# Patient Record
Sex: MALE | Race: WHITE | Employment: OTHER | URBAN - METROPOLITAN AREA
[De-identification: names, ages, dates, MRNs, and addresses within clinical notes are randomized per-mention and may not be internally consistent; named-entity substitution may affect disease eponyms.]

---

## 2017-06-27 ENCOUNTER — OFFICE VISIT (OUTPATIENT)
Dept: FAMILY MEDICINE CLINIC | Age: 52
End: 2017-06-27

## 2017-06-27 VITALS
DIASTOLIC BLOOD PRESSURE: 84 MMHG | BODY MASS INDEX: 25.77 KG/M2 | HEIGHT: 70 IN | WEIGHT: 180 LBS | HEART RATE: 100 BPM | OXYGEN SATURATION: 98 % | TEMPERATURE: 98.3 F | SYSTOLIC BLOOD PRESSURE: 138 MMHG

## 2017-06-27 DIAGNOSIS — M54.42 CHRONIC LEFT-SIDED LOW BACK PAIN WITH LEFT-SIDED SCIATICA: ICD-10-CM

## 2017-06-27 DIAGNOSIS — G89.29 CHRONIC LEFT SHOULDER PAIN: ICD-10-CM

## 2017-06-27 DIAGNOSIS — G89.29 CHRONIC LEFT-SIDED LOW BACK PAIN WITH LEFT-SIDED SCIATICA: ICD-10-CM

## 2017-06-27 DIAGNOSIS — M25.512 CHRONIC LEFT SHOULDER PAIN: ICD-10-CM

## 2017-06-27 DIAGNOSIS — M54.12 CERVICAL RADICULOPATHY: ICD-10-CM

## 2017-06-27 DIAGNOSIS — R29.898 WEAKNESS OF LEFT LEG: ICD-10-CM

## 2017-06-27 PROCEDURE — G8427 DOCREV CUR MEDS BY ELIG CLIN: HCPCS | Performed by: NURSE PRACTITIONER

## 2017-06-27 PROCEDURE — G8419 CALC BMI OUT NRM PARAM NOF/U: HCPCS | Performed by: NURSE PRACTITIONER

## 2017-06-27 PROCEDURE — 4004F PT TOBACCO SCREEN RCVD TLK: CPT | Performed by: NURSE PRACTITIONER

## 2017-06-27 PROCEDURE — 99203 OFFICE O/P NEW LOW 30 MIN: CPT | Performed by: NURSE PRACTITIONER

## 2017-06-27 RX ORDER — CYCLOBENZAPRINE HCL 10 MG
10 TABLET ORAL 3 TIMES DAILY PRN
Qty: 30 TABLET | Refills: 0 | Status: SHIPPED | OUTPATIENT
Start: 2017-06-27 | End: 2017-07-17 | Stop reason: SDUPTHER

## 2017-06-27 RX ORDER — PREDNISONE 10 MG/1
TABLET ORAL
Qty: 32 TABLET | Refills: 0 | Status: SHIPPED | OUTPATIENT
Start: 2017-06-27 | End: 2017-07-07

## 2017-06-29 ENCOUNTER — TELEPHONE (OUTPATIENT)
Dept: FAMILY MEDICINE CLINIC | Age: 52
End: 2017-06-29

## 2017-06-29 RX ORDER — GABAPENTIN 300 MG/1
CAPSULE ORAL
Qty: 60 CAPSULE | Refills: 0 | Status: SHIPPED | OUTPATIENT
Start: 2017-06-29 | End: 2017-07-07 | Stop reason: SDUPTHER

## 2017-06-30 ENCOUNTER — HOSPITAL ENCOUNTER (OUTPATIENT)
Dept: MRI IMAGING | Age: 52
Discharge: HOME | End: 2017-06-30
Attending: NURSE PRACTITIONER | Admitting: NURSE PRACTITIONER

## 2017-06-30 DIAGNOSIS — R29.898 WEAKNESS OF LEFT LEG: ICD-10-CM

## 2017-06-30 DIAGNOSIS — M54.12 CERVICAL RADICULOPATHY: ICD-10-CM

## 2017-06-30 DIAGNOSIS — M54.42 CHRONIC LEFT-SIDED LOW BACK PAIN WITH LEFT-SIDED SCIATICA: ICD-10-CM

## 2017-06-30 DIAGNOSIS — M54.42 LOW BACK PAIN WITH LEFT-SIDED SCIATICA: ICD-10-CM

## 2017-06-30 DIAGNOSIS — G89.29 CHRONIC LEFT-SIDED LOW BACK PAIN WITH LEFT-SIDED SCIATICA: ICD-10-CM

## 2017-07-03 DIAGNOSIS — M48.02 SPINAL STENOSIS OF CERVICAL REGION: ICD-10-CM

## 2017-07-03 DIAGNOSIS — R93.7 ABNORMAL FINDINGS ON DIAGNOSTIC IMAGING OF OTHER PARTS OF MUSCULOSKELETAL SYSTEM: ICD-10-CM

## 2017-07-03 DIAGNOSIS — M48.02 FORAMINAL STENOSIS OF CERVICAL REGION: ICD-10-CM

## 2017-07-03 DIAGNOSIS — M45.2 ANKYLOSING SPONDYLITIS OF CERVICAL REGION (HCC): ICD-10-CM

## 2017-07-03 DIAGNOSIS — M48.061 LUMBAR STENOSIS: ICD-10-CM

## 2017-07-03 RX ORDER — DICLOFENAC SODIUM 75 MG/1
75 TABLET, DELAYED RELEASE ORAL 2 TIMES DAILY
Qty: 60 TABLET | Refills: 0 | Status: SHIPPED | OUTPATIENT
Start: 2017-07-03 | End: 2018-05-31 | Stop reason: ALTCHOICE

## 2017-07-05 ENCOUNTER — TELEPHONE (OUTPATIENT)
Dept: FAMILY MEDICINE CLINIC | Age: 52
End: 2017-07-05

## 2017-07-05 ENCOUNTER — OFFICE VISIT (OUTPATIENT)
Dept: ORTHOPEDIC SURGERY | Age: 52
End: 2017-07-05

## 2017-07-05 VITALS
HEIGHT: 70 IN | BODY MASS INDEX: 25.77 KG/M2 | HEART RATE: 85 BPM | DIASTOLIC BLOOD PRESSURE: 83 MMHG | WEIGHT: 180 LBS | SYSTOLIC BLOOD PRESSURE: 131 MMHG

## 2017-07-05 DIAGNOSIS — R29.898 LEFT ARM WEAKNESS: ICD-10-CM

## 2017-07-05 DIAGNOSIS — M50.20 HNP (HERNIATED NUCLEUS PULPOSUS), CERVICAL: Primary | ICD-10-CM

## 2017-07-05 DIAGNOSIS — G89.29 CHRONIC LEFT SHOULDER PAIN: ICD-10-CM

## 2017-07-05 DIAGNOSIS — M48.02 FORAMINAL STENOSIS OF CERVICAL REGION: ICD-10-CM

## 2017-07-05 DIAGNOSIS — M48.061 LUMBAR STENOSIS: ICD-10-CM

## 2017-07-05 DIAGNOSIS — M25.512 CHRONIC LEFT SHOULDER PAIN: ICD-10-CM

## 2017-07-05 PROCEDURE — 4004F PT TOBACCO SCREEN RCVD TLK: CPT | Performed by: PHYSICIAN ASSISTANT

## 2017-07-05 PROCEDURE — G8427 DOCREV CUR MEDS BY ELIG CLIN: HCPCS | Performed by: PHYSICIAN ASSISTANT

## 2017-07-05 PROCEDURE — G8419 CALC BMI OUT NRM PARAM NOF/U: HCPCS | Performed by: PHYSICIAN ASSISTANT

## 2017-07-05 PROCEDURE — 99203 OFFICE O/P NEW LOW 30 MIN: CPT | Performed by: PHYSICIAN ASSISTANT

## 2017-07-05 RX ORDER — OXYCODONE AND ACETAMINOPHEN 7.5; 325 MG/1; MG/1
1 TABLET ORAL EVERY 4 HOURS PRN
Qty: 15 TABLET | Refills: 0 | Status: SHIPPED | OUTPATIENT
Start: 2017-07-05 | End: 2017-07-10

## 2017-07-05 ASSESSMENT — ENCOUNTER SYMPTOMS
BACK PAIN: 1
COLOR CHANGE: 0
VOMITING: 0
SHORTNESS OF BREATH: 0
DIARRHEA: 0
NAUSEA: 0
BLOOD IN STOOL: 0
ABDOMINAL PAIN: 0
COUGH: 0

## 2017-07-05 NOTE — TELEPHONE ENCOUNTER
Referral to Ortho Per Allan Adrian there today for office visit. .  They want the patient to get EMG and bloodwork. Not sure what is going on? In a lot of pain. Can something be called in for pain? Please call patient wife or patient and advise asap.

## 2017-07-06 ENCOUNTER — HOSPITAL ENCOUNTER (OUTPATIENT)
Dept: MRI IMAGING | Age: 52
Discharge: HOME | End: 2017-07-06
Attending: NURSE PRACTITIONER | Admitting: NURSE PRACTITIONER

## 2017-07-06 DIAGNOSIS — G89.29 CHRONIC LEFT SHOULDER PAIN: ICD-10-CM

## 2017-07-06 DIAGNOSIS — R29.898 LEFT ARM WEAKNESS: ICD-10-CM

## 2017-07-06 DIAGNOSIS — M25.512 CHRONIC LEFT SHOULDER PAIN: ICD-10-CM

## 2017-07-06 DIAGNOSIS — M54.42 LOW BACK PAIN WITH LEFT-SIDED SCIATICA: ICD-10-CM

## 2017-07-07 ENCOUNTER — TELEPHONE (OUTPATIENT)
Dept: ORTHOPEDIC SURGERY | Age: 52
End: 2017-07-07

## 2017-07-07 DIAGNOSIS — M25.512 CHRONIC LEFT SHOULDER PAIN: ICD-10-CM

## 2017-07-07 DIAGNOSIS — G89.29 CHRONIC LEFT SHOULDER PAIN: ICD-10-CM

## 2017-07-07 DIAGNOSIS — M75.112 PARTIAL TEAR OF LEFT ROTATOR CUFF: ICD-10-CM

## 2017-07-07 RX ORDER — GABAPENTIN 300 MG/1
300 CAPSULE ORAL 3 TIMES DAILY
Qty: 90 CAPSULE | Refills: 0 | Status: SHIPPED | OUTPATIENT
Start: 2017-07-07 | End: 2017-07-10

## 2017-07-12 ENCOUNTER — HOSPITAL ENCOUNTER (OUTPATIENT)
Dept: PHYSICAL THERAPY | Age: 52
Discharge: HOME | End: 2017-07-31
Attending: NURSE PRACTITIONER | Admitting: NURSE PRACTITIONER

## 2017-07-13 ENCOUNTER — OFFICE VISIT (OUTPATIENT)
Dept: ORTHOPEDIC SURGERY | Age: 52
End: 2017-07-13

## 2017-07-13 VITALS — WEIGHT: 170 LBS | BODY MASS INDEX: 24.34 KG/M2 | HEIGHT: 70 IN

## 2017-07-13 VITALS
BODY MASS INDEX: 24.34 KG/M2 | HEIGHT: 70 IN | SYSTOLIC BLOOD PRESSURE: 132 MMHG | DIASTOLIC BLOOD PRESSURE: 80 MMHG | WEIGHT: 170 LBS | HEART RATE: 92 BPM

## 2017-07-13 DIAGNOSIS — M50.30 DDD (DEGENERATIVE DISC DISEASE), CERVICAL: ICD-10-CM

## 2017-07-13 DIAGNOSIS — M25.512 LEFT SHOULDER PAIN, UNSPECIFIED CHRONICITY: ICD-10-CM

## 2017-07-13 DIAGNOSIS — M54.12 CERVICAL RADICULOPATHY: Primary | ICD-10-CM

## 2017-07-13 DIAGNOSIS — M47.812 FACET ARTHROPATHY, CERVICAL: Primary | ICD-10-CM

## 2017-07-13 DIAGNOSIS — M75.42 IMPINGEMENT SYNDROME OF SHOULDER, LEFT: ICD-10-CM

## 2017-07-13 PROBLEM — M75.40 IMPINGEMENT SYNDROME OF SHOULDER: Status: ACTIVE | Noted: 2017-07-13

## 2017-07-13 PROCEDURE — G8427 DOCREV CUR MEDS BY ELIG CLIN: HCPCS | Performed by: PHYSICIAN ASSISTANT

## 2017-07-13 PROCEDURE — 99213 OFFICE O/P EST LOW 20 MIN: CPT | Performed by: PHYSICIAN ASSISTANT

## 2017-07-13 PROCEDURE — 4004F PT TOBACCO SCREEN RCVD TLK: CPT | Performed by: PHYSICIAN ASSISTANT

## 2017-07-13 PROCEDURE — G8420 CALC BMI NORM PARAMETERS: HCPCS | Performed by: ORTHOPAEDIC SURGERY

## 2017-07-13 PROCEDURE — 99214 OFFICE O/P EST MOD 30 MIN: CPT | Performed by: ORTHOPAEDIC SURGERY

## 2017-07-13 PROCEDURE — 4004F PT TOBACCO SCREEN RCVD TLK: CPT | Performed by: ORTHOPAEDIC SURGERY

## 2017-07-13 PROCEDURE — G8420 CALC BMI NORM PARAMETERS: HCPCS | Performed by: PHYSICIAN ASSISTANT

## 2017-07-13 PROCEDURE — G8427 DOCREV CUR MEDS BY ELIG CLIN: HCPCS | Performed by: ORTHOPAEDIC SURGERY

## 2017-07-13 PROCEDURE — 73030 X-RAY EXAM OF SHOULDER: CPT | Performed by: ORTHOPAEDIC SURGERY

## 2017-07-17 ENCOUNTER — OFFICE VISIT (OUTPATIENT)
Dept: FAMILY MEDICINE CLINIC | Age: 52
End: 2017-07-17

## 2017-07-17 VITALS
SYSTOLIC BLOOD PRESSURE: 138 MMHG | OXYGEN SATURATION: 98 % | HEIGHT: 70 IN | HEART RATE: 84 BPM | BODY MASS INDEX: 26.63 KG/M2 | RESPIRATION RATE: 18 BRPM | WEIGHT: 186 LBS | DIASTOLIC BLOOD PRESSURE: 84 MMHG

## 2017-07-17 DIAGNOSIS — M48.02 FORAMINAL STENOSIS OF CERVICAL REGION: ICD-10-CM

## 2017-07-17 DIAGNOSIS — M54.12 CERVICAL RADICULOPATHY: ICD-10-CM

## 2017-07-17 DIAGNOSIS — M48.061 LUMBAR STENOSIS: ICD-10-CM

## 2017-07-17 DIAGNOSIS — M50.30 DDD (DEGENERATIVE DISC DISEASE), CERVICAL: ICD-10-CM

## 2017-07-17 PROCEDURE — G8427 DOCREV CUR MEDS BY ELIG CLIN: HCPCS | Performed by: NURSE PRACTITIONER

## 2017-07-17 PROCEDURE — G8419 CALC BMI OUT NRM PARAM NOF/U: HCPCS | Performed by: NURSE PRACTITIONER

## 2017-07-17 PROCEDURE — 99213 OFFICE O/P EST LOW 20 MIN: CPT | Performed by: NURSE PRACTITIONER

## 2017-07-17 PROCEDURE — 4004F PT TOBACCO SCREEN RCVD TLK: CPT | Performed by: NURSE PRACTITIONER

## 2017-07-17 RX ORDER — CYCLOBENZAPRINE HCL 10 MG
10 TABLET ORAL 3 TIMES DAILY PRN
Qty: 30 TABLET | Refills: 1 | Status: SHIPPED | OUTPATIENT
Start: 2017-07-17 | End: 2017-07-27

## 2017-07-24 DIAGNOSIS — R93.7 ABNORMAL FINDINGS ON DIAGNOSTIC IMAGING OF OTHER PARTS OF MUSCULOSKELETAL SYSTEM: ICD-10-CM

## 2017-07-24 DIAGNOSIS — M45.2 ANKYLOSING SPONDYLITIS OF CERVICAL REGION (HCC): ICD-10-CM

## 2017-07-24 LAB
A/G RATIO: 1.6 (ref 1.1–2.2)
ALBUMIN SERPL-MCNC: 4.9 G/DL (ref 3.4–5)
ALP BLD-CCNC: 100 U/L (ref 40–129)
ALT SERPL-CCNC: 37 U/L (ref 10–40)
ANION GAP SERPL CALCULATED.3IONS-SCNC: 19 MMOL/L (ref 3–16)
AST SERPL-CCNC: 17 U/L (ref 15–37)
BASOPHILS ABSOLUTE: 0 K/UL (ref 0–0.2)
BASOPHILS RELATIVE PERCENT: 0.3 %
BILIRUB SERPL-MCNC: 0.5 MG/DL (ref 0–1)
BUN BLDV-MCNC: 12 MG/DL (ref 7–20)
C-REACTIVE PROTEIN: 7.8 MG/L (ref 0–5.1)
CALCIUM SERPL-MCNC: 9.6 MG/DL (ref 8.3–10.6)
CHLORIDE BLD-SCNC: 102 MMOL/L (ref 99–110)
CO2: 19 MMOL/L (ref 21–32)
CREAT SERPL-MCNC: 0.9 MG/DL (ref 0.9–1.3)
EOSINOPHILS ABSOLUTE: 0.1 K/UL (ref 0–0.6)
EOSINOPHILS RELATIVE PERCENT: 1.2 %
GFR AFRICAN AMERICAN: >60
GFR NON-AFRICAN AMERICAN: >60
GLOBULIN: 3.1 G/DL
GLUCOSE BLD-MCNC: 102 MG/DL (ref 70–99)
HCT VFR BLD CALC: 51.3 % (ref 40.5–52.5)
HEMOGLOBIN: 16.7 G/DL (ref 13.5–17.5)
LYMPHOCYTES ABSOLUTE: 4.3 K/UL (ref 1–5.1)
LYMPHOCYTES RELATIVE PERCENT: 39.9 %
MCH RBC QN AUTO: 29.8 PG (ref 26–34)
MCHC RBC AUTO-ENTMCNC: 32.7 G/DL (ref 31–36)
MCV RBC AUTO: 91.2 FL (ref 80–100)
MONOCYTES ABSOLUTE: 0.6 K/UL (ref 0–1.3)
MONOCYTES RELATIVE PERCENT: 5.3 %
NEUTROPHILS ABSOLUTE: 5.8 K/UL (ref 1.7–7.7)
NEUTROPHILS RELATIVE PERCENT: 53.3 %
PDW BLD-RTO: 14.5 % (ref 12.4–15.4)
PLATELET # BLD: 409 K/UL (ref 135–450)
PMV BLD AUTO: 8 FL (ref 5–10.5)
POTASSIUM SERPL-SCNC: 4.7 MMOL/L (ref 3.5–5.1)
RBC # BLD: 5.62 M/UL (ref 4.2–5.9)
RHEUMATOID FACTOR: <10 IU/ML
SEDIMENTATION RATE, ERYTHROCYTE: 10 MM/HR (ref 0–15)
SODIUM BLD-SCNC: 140 MMOL/L (ref 136–145)
TOTAL PROTEIN: 8 G/DL (ref 6.4–8.2)
WBC # BLD: 10.9 K/UL (ref 4–11)

## 2017-07-25 ENCOUNTER — TELEPHONE (OUTPATIENT)
Dept: FAMILY MEDICINE CLINIC | Age: 52
End: 2017-07-25

## 2017-07-25 ENCOUNTER — OFFICE VISIT (OUTPATIENT)
Dept: ORTHOPEDIC SURGERY | Age: 52
End: 2017-07-25

## 2017-07-25 VITALS
HEIGHT: 70 IN | DIASTOLIC BLOOD PRESSURE: 88 MMHG | WEIGHT: 179.9 LBS | SYSTOLIC BLOOD PRESSURE: 125 MMHG | BODY MASS INDEX: 25.75 KG/M2 | HEART RATE: 118 BPM

## 2017-07-25 DIAGNOSIS — M47.812 CERVICAL SPONDYLOSIS WITHOUT MYELOPATHY: Primary | ICD-10-CM

## 2017-07-25 LAB
ANA INTERPRETATION: NORMAL
ANTI-NUCLEAR ANTIBODY (ANA): NEGATIVE

## 2017-07-25 PROCEDURE — 4004F PT TOBACCO SCREEN RCVD TLK: CPT | Performed by: ORTHOPAEDIC SURGERY

## 2017-07-25 PROCEDURE — G8427 DOCREV CUR MEDS BY ELIG CLIN: HCPCS | Performed by: ORTHOPAEDIC SURGERY

## 2017-07-25 PROCEDURE — 99213 OFFICE O/P EST LOW 20 MIN: CPT | Performed by: ORTHOPAEDIC SURGERY

## 2017-07-25 PROCEDURE — G8419 CALC BMI OUT NRM PARAM NOF/U: HCPCS | Performed by: ORTHOPAEDIC SURGERY

## 2017-07-26 LAB
CCP IGG ANTIBODIES: 3 UNITS (ref 0–19)
HLA B27: NEGATIVE

## 2017-08-18 PROBLEM — M47.812 FACET ARTHROPATHY, CERVICAL: Status: ACTIVE | Noted: 2017-08-18

## 2017-08-18 PROBLEM — M48.061 LUMBAR STENOSIS: Status: ACTIVE | Noted: 2017-08-18

## 2017-08-18 PROBLEM — M48.02 CERVICAL STENOSIS OF SPINAL CANAL: Status: ACTIVE | Noted: 2017-08-18

## 2017-08-18 PROBLEM — M50.20 CERVICAL DISC DISPLACEMENT: Status: ACTIVE | Noted: 2017-08-18

## 2017-08-18 PROBLEM — M51.36 LUMBAR DEGENERATIVE DISC DISEASE: Status: ACTIVE | Noted: 2017-08-18

## 2017-08-18 PROBLEM — M47.816 LUMBAR FACET ARTHROPATHY: Status: ACTIVE | Noted: 2017-08-18

## 2017-08-29 RX ORDER — GABAPENTIN 300 MG/1
300 CAPSULE ORAL 3 TIMES DAILY
Qty: 90 CAPSULE | Refills: 0 | Status: SHIPPED | OUTPATIENT
Start: 2017-08-29 | End: 2019-12-06

## 2019-05-18 ENCOUNTER — HOSPITAL ENCOUNTER (EMERGENCY)
Age: 54
Discharge: HOME | End: 2019-05-18
Payer: MEDICARE

## 2019-05-18 VITALS
TEMPERATURE: 98.4 F | HEIGHT: 70 IN | HEART RATE: 85 BPM | DIASTOLIC BLOOD PRESSURE: 90 MMHG | BODY MASS INDEX: 25.77 KG/M2 | SYSTOLIC BLOOD PRESSURE: 136 MMHG | WEIGHT: 180 LBS | RESPIRATION RATE: 16 BRPM | OXYGEN SATURATION: 96 %

## 2019-05-18 DIAGNOSIS — G89.29 ACUTE EXACERBATION OF CHRONIC LOW BACK PAIN: Primary | ICD-10-CM

## 2019-05-18 DIAGNOSIS — M54.50 ACUTE EXACERBATION OF CHRONIC LOW BACK PAIN: Primary | ICD-10-CM

## 2019-05-18 PROCEDURE — 99282 EMERGENCY DEPT VISIT SF MDM: CPT

## 2019-05-18 PROCEDURE — 6370000000 HC RX 637 (ALT 250 FOR IP): Performed by: PHYSICIAN ASSISTANT

## 2019-05-18 PROCEDURE — 6360000002 HC RX W HCPCS: Performed by: PHYSICIAN ASSISTANT

## 2019-05-18 RX ORDER — DEXAMETHASONE 4 MG/1
10 TABLET ORAL EVERY 12 HOURS SCHEDULED
Status: DISCONTINUED | OUTPATIENT
Start: 2019-05-18 | End: 2019-05-18

## 2019-05-18 RX ORDER — OXYCODONE HYDROCHLORIDE AND ACETAMINOPHEN 5; 325 MG/1; MG/1
1 TABLET ORAL ONCE
Status: COMPLETED | OUTPATIENT
Start: 2019-05-18 | End: 2019-05-18

## 2019-05-18 RX ORDER — CYCLOBENZAPRINE HCL 10 MG
TABLET ORAL
COMMUNITY
Start: 2019-05-07 | End: 2019-06-22 | Stop reason: ALTCHOICE

## 2019-05-18 RX ORDER — DEXAMETHASONE 4 MG/1
10 TABLET ORAL EVERY 12 HOURS SCHEDULED
Status: DISCONTINUED | OUTPATIENT
Start: 2019-05-18 | End: 2019-05-18 | Stop reason: HOSPADM

## 2019-05-18 RX ORDER — METHYLPREDNISOLONE 4 MG/1
TABLET ORAL
Qty: 1 KIT | Refills: 0 | Status: SHIPPED | OUTPATIENT
Start: 2019-05-18 | End: 2019-05-24

## 2019-05-18 RX ORDER — METHOCARBAMOL 750 MG/1
750 TABLET, FILM COATED ORAL 4 TIMES DAILY
Qty: 40 TABLET | Refills: 0 | Status: SHIPPED | OUTPATIENT
Start: 2019-05-18 | End: 2019-05-28

## 2019-05-18 RX ORDER — OMEPRAZOLE 20 MG/1
20 CAPSULE, DELAYED RELEASE ORAL
COMMUNITY

## 2019-05-18 RX ORDER — METHOCARBAMOL 750 MG/1
750 TABLET, FILM COATED ORAL ONCE
Status: COMPLETED | OUTPATIENT
Start: 2019-05-18 | End: 2019-05-18

## 2019-05-18 RX ORDER — ATORVASTATIN CALCIUM 40 MG/1
40 TABLET, FILM COATED ORAL
COMMUNITY
Start: 2019-04-02 | End: 2019-12-06

## 2019-05-18 RX ORDER — OXYCODONE HYDROCHLORIDE AND ACETAMINOPHEN 5; 325 MG/1; MG/1
1 TABLET ORAL EVERY 8 HOURS PRN
Qty: 12 TABLET | Refills: 0 | Status: SHIPPED | OUTPATIENT
Start: 2019-05-18 | End: 2019-06-22

## 2019-05-18 RX ADMIN — METHOCARBAMOL TABLETS 750 MG: 750 TABLET, COATED ORAL at 13:59

## 2019-05-18 RX ADMIN — DEXAMETHASONE 10 MG: 4 TABLET ORAL at 13:59

## 2019-05-18 RX ADMIN — OXYCODONE HYDROCHLORIDE AND ACETAMINOPHEN 1 TABLET: 5; 325 TABLET ORAL at 13:59

## 2019-05-18 ASSESSMENT — PAIN SCALES - GENERAL
PAINLEVEL_OUTOF10: 10
PAINLEVEL_OUTOF10: 10
PAINLEVEL_OUTOF10: 6

## 2019-05-18 ASSESSMENT — PAIN DESCRIPTION - PAIN TYPE: TYPE: CHRONIC PAIN
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
## 2019-06-22 ENCOUNTER — HOSPITAL ENCOUNTER (EMERGENCY)
Age: 54
Discharge: HOME | End: 2019-06-22
Payer: MEDICARE

## 2019-06-22 VITALS
RESPIRATION RATE: 16 BRPM | HEIGHT: 70 IN | DIASTOLIC BLOOD PRESSURE: 85 MMHG | TEMPERATURE: 98.6 F | OXYGEN SATURATION: 95 % | SYSTOLIC BLOOD PRESSURE: 131 MMHG | BODY MASS INDEX: 25.77 KG/M2 | WEIGHT: 180 LBS | HEART RATE: 90 BPM

## 2019-06-22 DIAGNOSIS — G89.29 CHRONIC LEFT-SIDED LOW BACK PAIN WITH LEFT-SIDED SCIATICA: Primary | ICD-10-CM

## 2019-06-22 DIAGNOSIS — M54.42 CHRONIC LEFT-SIDED LOW BACK PAIN WITH LEFT-SIDED SCIATICA: Primary | ICD-10-CM

## 2019-06-22 PROCEDURE — 6370000000 HC RX 637 (ALT 250 FOR IP): Performed by: NURSE PRACTITIONER

## 2019-06-22 PROCEDURE — 96372 THER/PROPH/DIAG INJ SC/IM: CPT

## 2019-06-22 PROCEDURE — 99282 EMERGENCY DEPT VISIT SF MDM: CPT

## 2019-06-22 PROCEDURE — 6360000002 HC RX W HCPCS: Performed by: NURSE PRACTITIONER

## 2019-06-22 RX ORDER — ONDANSETRON 4 MG/1
4 TABLET, ORALLY DISINTEGRATING ORAL ONCE
Status: COMPLETED | OUTPATIENT
Start: 2019-06-22 | End: 2019-06-22

## 2019-06-22 RX ORDER — METHYLPREDNISOLONE 4 MG/1
TABLET ORAL
Qty: 1 KIT | Refills: 0 | Status: SHIPPED | OUTPATIENT
Start: 2019-06-22 | End: 2019-06-28

## 2019-06-22 RX ORDER — TIZANIDINE 4 MG/1
4 TABLET ORAL EVERY 6 HOURS PRN
COMMUNITY
End: 2019-12-06

## 2019-06-22 RX ORDER — HYDROCODONE BITARTRATE AND ACETAMINOPHEN 5; 325 MG/1; MG/1
1 TABLET ORAL EVERY 6 HOURS PRN
Qty: 10 TABLET | Refills: 0 | Status: SHIPPED | OUTPATIENT
Start: 2019-06-22 | End: 2019-06-25

## 2019-06-22 RX ORDER — ONDANSETRON 4 MG/1
4 TABLET, ORALLY DISINTEGRATING ORAL EVERY 8 HOURS PRN
Qty: 20 TABLET | Refills: 0 | Status: SHIPPED | OUTPATIENT
Start: 2019-06-22 | End: 2019-12-06

## 2019-06-22 RX ADMIN — HYDROMORPHONE HYDROCHLORIDE 1 MG: 1 INJECTION, SOLUTION INTRAMUSCULAR; INTRAVENOUS; SUBCUTANEOUS at 14:46

## 2019-06-22 RX ADMIN — ONDANSETRON 4 MG: 4 TABLET, ORALLY DISINTEGRATING ORAL at 14:46

## 2019-06-22 ASSESSMENT — PAIN SCALES - GENERAL
PAINLEVEL_OUTOF10: 10
PAINLEVEL_OUTOF10: 5
PAINLEVEL_OUTOF10: 10

## 2019-06-22 ASSESSMENT — ENCOUNTER SYMPTOMS
WHEEZING: 0
COUGH: 0
ABDOMINAL DISTENTION: 0
ABDOMINAL PAIN: 0
VOMITING: 0
SHORTNESS OF BREATH: 0
BACK PAIN: 1
ALLERGIC/IMMUNOLOGIC NEGATIVE: 1
NAUSEA: 0
EYES NEGATIVE: 1

## 2019-06-22 ASSESSMENT — PAIN DESCRIPTION - PAIN TYPE: TYPE: CHRONIC PAIN

## 2019-06-22 ASSESSMENT — PAIN DESCRIPTION - LOCATION: LOCATION: BACK
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
## 2019-06-22 NOTE — ED NOTES
Pt scripts x2 instructed to follow up with PCP/Neurologist. Assessed per Marissa Metcalf NP.      Deangelo Villanueva LPN  24/99/00 8159

## 2019-12-06 ENCOUNTER — HOSPITAL ENCOUNTER (EMERGENCY)
Age: 54
Discharge: HOME | End: 2019-12-06
Payer: MEDICARE

## 2019-12-06 ENCOUNTER — APPOINTMENT (OUTPATIENT)
Dept: GENERAL RADIOLOGY | Age: 54
End: 2019-12-06
Payer: MEDICARE

## 2019-12-06 ENCOUNTER — APPOINTMENT (OUTPATIENT)
Dept: CT IMAGING | Age: 54
End: 2019-12-06
Payer: MEDICARE

## 2019-12-06 VITALS
WEIGHT: 180 LBS | RESPIRATION RATE: 16 BRPM | OXYGEN SATURATION: 95 % | HEART RATE: 68 BPM | DIASTOLIC BLOOD PRESSURE: 68 MMHG | BODY MASS INDEX: 25.83 KG/M2 | TEMPERATURE: 98.1 F | SYSTOLIC BLOOD PRESSURE: 133 MMHG

## 2019-12-06 DIAGNOSIS — S02.2XXA CLOSED FRACTURE OF NASAL BONE, INITIAL ENCOUNTER: ICD-10-CM

## 2019-12-06 DIAGNOSIS — S09.90XA CLOSED HEAD INJURY, INITIAL ENCOUNTER: Primary | ICD-10-CM

## 2019-12-06 DIAGNOSIS — S02.85XA RIGHT ORBITAL FRACTURE, CLOSED, INITIAL ENCOUNTER (HCC): ICD-10-CM

## 2019-12-06 LAB
A/G RATIO: 1.3 (ref 1.1–2.2)
ALBUMIN SERPL-MCNC: 4.3 G/DL (ref 3.4–5)
ALP BLD-CCNC: 94 U/L (ref 40–129)
ALT SERPL-CCNC: 23 U/L (ref 10–40)
ANION GAP SERPL CALCULATED.3IONS-SCNC: 12 MMOL/L (ref 3–16)
AST SERPL-CCNC: 18 U/L (ref 15–37)
BASOPHILS ABSOLUTE: 0 K/UL (ref 0–0.2)
BASOPHILS RELATIVE PERCENT: 0.4 %
BILIRUB SERPL-MCNC: <0.2 MG/DL (ref 0–1)
BILIRUBIN URINE: NEGATIVE
BLOOD, URINE: NEGATIVE
BUN BLDV-MCNC: 12 MG/DL (ref 7–20)
CALCIUM SERPL-MCNC: 9.2 MG/DL (ref 8.3–10.6)
CHLORIDE BLD-SCNC: 104 MMOL/L (ref 99–110)
CLARITY: CLEAR
CO2: 21 MMOL/L (ref 21–32)
COLOR: YELLOW
CREAT SERPL-MCNC: 0.7 MG/DL (ref 0.9–1.3)
EOSINOPHILS ABSOLUTE: 0.1 K/UL (ref 0–0.6)
EOSINOPHILS RELATIVE PERCENT: 0.7 %
GFR AFRICAN AMERICAN: >60
GFR NON-AFRICAN AMERICAN: >60
GLOBULIN: 3.4 G/DL
GLUCOSE BLD-MCNC: 176 MG/DL (ref 70–99)
GLUCOSE URINE: NEGATIVE MG/DL
HCT VFR BLD CALC: 43.3 % (ref 40.5–52.5)
HEMOGLOBIN: 14.5 G/DL (ref 13.5–17.5)
KETONES, URINE: NEGATIVE MG/DL
LEUKOCYTE ESTERASE, URINE: NEGATIVE
LYMPHOCYTES ABSOLUTE: 4.1 K/UL (ref 1–5.1)
LYMPHOCYTES RELATIVE PERCENT: 37.1 %
MCH RBC QN AUTO: 30.5 PG (ref 26–34)
MCHC RBC AUTO-ENTMCNC: 33.5 G/DL (ref 31–36)
MCV RBC AUTO: 91.1 FL (ref 80–100)
MICROSCOPIC EXAMINATION: NORMAL
MONOCYTES ABSOLUTE: 0.5 K/UL (ref 0–1.3)
MONOCYTES RELATIVE PERCENT: 4.1 %
NEUTROPHILS ABSOLUTE: 6.4 K/UL (ref 1.7–7.7)
NEUTROPHILS RELATIVE PERCENT: 57.7 %
NITRITE, URINE: NEGATIVE
PDW BLD-RTO: 13.4 % (ref 12.4–15.4)
PH UA: 6 (ref 5–8)
PLATELET # BLD: 475 K/UL (ref 135–450)
PMV BLD AUTO: 6.9 FL (ref 5–10.5)
POTASSIUM SERPL-SCNC: 4.2 MMOL/L (ref 3.5–5.1)
PROTEIN UA: NEGATIVE MG/DL
RAPID INFLUENZA  B AGN: NEGATIVE
RAPID INFLUENZA A AGN: NEGATIVE
RBC # BLD: 4.76 M/UL (ref 4.2–5.9)
SODIUM BLD-SCNC: 137 MMOL/L (ref 136–145)
SPECIFIC GRAVITY UA: 1.01 (ref 1–1.03)
TOTAL PROTEIN: 7.7 G/DL (ref 6.4–8.2)
TROPONIN: <0.01 NG/ML
URINE REFLEX TO CULTURE: NORMAL
URINE TYPE: NORMAL
UROBILINOGEN, URINE: 0.2 E.U./DL
WBC # BLD: 11.1 K/UL (ref 4–11)

## 2019-12-06 PROCEDURE — 87804 INFLUENZA ASSAY W/OPTIC: CPT

## 2019-12-06 PROCEDURE — 6360000002 HC RX W HCPCS: Performed by: NURSE PRACTITIONER

## 2019-12-06 PROCEDURE — 99285 EMERGENCY DEPT VISIT HI MDM: CPT

## 2019-12-06 PROCEDURE — 71046 X-RAY EXAM CHEST 2 VIEWS: CPT

## 2019-12-06 PROCEDURE — 84484 ASSAY OF TROPONIN QUANT: CPT

## 2019-12-06 PROCEDURE — 80053 COMPREHEN METABOLIC PANEL: CPT

## 2019-12-06 PROCEDURE — 70450 CT HEAD/BRAIN W/O DYE: CPT

## 2019-12-06 PROCEDURE — 70486 CT MAXILLOFACIAL W/O DYE: CPT

## 2019-12-06 PROCEDURE — 96374 THER/PROPH/DIAG INJ IV PUSH: CPT

## 2019-12-06 PROCEDURE — 81003 URINALYSIS AUTO W/O SCOPE: CPT

## 2019-12-06 PROCEDURE — 85025 COMPLETE CBC W/AUTO DIFF WBC: CPT

## 2019-12-06 PROCEDURE — 96375 TX/PRO/DX INJ NEW DRUG ADDON: CPT

## 2019-12-06 PROCEDURE — 6370000000 HC RX 637 (ALT 250 FOR IP): Performed by: NURSE PRACTITIONER

## 2019-12-06 PROCEDURE — 93005 ELECTROCARDIOGRAM TRACING: CPT | Performed by: EMERGENCY MEDICINE

## 2019-12-06 RX ORDER — AMOXICILLIN AND CLAVULANATE POTASSIUM 875; 125 MG/1; MG/1
1 TABLET, FILM COATED ORAL 2 TIMES DAILY
Qty: 20 TABLET | Refills: 0 | Status: SHIPPED | OUTPATIENT
Start: 2019-12-06 | End: 2019-12-16

## 2019-12-06 RX ORDER — ACETAMINOPHEN 500 MG
1000 TABLET ORAL ONCE
Status: COMPLETED | OUTPATIENT
Start: 2019-12-06 | End: 2019-12-06

## 2019-12-06 RX ORDER — KETOROLAC TROMETHAMINE 30 MG/ML
30 INJECTION, SOLUTION INTRAMUSCULAR; INTRAVENOUS ONCE
Status: COMPLETED | OUTPATIENT
Start: 2019-12-06 | End: 2019-12-06

## 2019-12-06 RX ORDER — PROCHLORPERAZINE EDISYLATE 5 MG/ML
10 INJECTION INTRAMUSCULAR; INTRAVENOUS ONCE
Status: COMPLETED | OUTPATIENT
Start: 2019-12-06 | End: 2019-12-06

## 2019-12-06 RX ORDER — LORAZEPAM 2 MG/ML
0.5 INJECTION INTRAMUSCULAR ONCE
Status: COMPLETED | OUTPATIENT
Start: 2019-12-06 | End: 2019-12-06

## 2019-12-06 RX ORDER — TRAMADOL HYDROCHLORIDE 50 MG/1
50 TABLET ORAL EVERY 6 HOURS PRN
Qty: 20 TABLET | Refills: 0 | Status: SHIPPED | OUTPATIENT
Start: 2019-12-06 | End: 2019-12-09

## 2019-12-06 RX ADMIN — LORAZEPAM 0.5 MG: 2 INJECTION, SOLUTION INTRAMUSCULAR; INTRAVENOUS at 15:08

## 2019-12-06 RX ADMIN — KETOROLAC TROMETHAMINE 30 MG: 30 INJECTION, SOLUTION INTRAMUSCULAR at 16:11

## 2019-12-06 RX ADMIN — PROCHLORPERAZINE EDISYLATE 10 MG: 5 INJECTION INTRAMUSCULAR; INTRAVENOUS at 16:11

## 2019-12-06 RX ADMIN — ACETAMINOPHEN 1000 MG: 500 TABLET ORAL at 14:42

## 2019-12-06 ASSESSMENT — ENCOUNTER SYMPTOMS
NAUSEA: 0
SHORTNESS OF BREATH: 0
PHOTOPHOBIA: 0
SORE THROAT: 0
EYE REDNESS: 0
COLOR CHANGE: 0
BACK PAIN: 0
DIARRHEA: 0
ABDOMINAL PAIN: 0
COUGH: 1
WHEEZING: 0
VOMITING: 0

## 2019-12-06 ASSESSMENT — PAIN SCALES - GENERAL
PAINLEVEL_OUTOF10: 8
PAINLEVEL_OUTOF10: 10
PAINLEVEL_OUTOF10: 9
PAINLEVEL_OUTOF10: 10
PAINLEVEL_OUTOF10: 10

## 2019-12-07 LAB
EKG ATRIAL RATE: 67 BPM
EKG DIAGNOSIS: NORMAL
EKG P AXIS: 55 DEGREES
EKG P-R INTERVAL: 150 MS
EKG Q-T INTERVAL: 414 MS
EKG QRS DURATION: 88 MS
EKG QTC CALCULATION (BAZETT): 437 MS
EKG R AXIS: 31 DEGREES
EKG T AXIS: 51 DEGREES
EKG VENTRICULAR RATE: 67 BPM

## 2022-01-01 ENCOUNTER — APPOINTMENT (OUTPATIENT)
Dept: GENERAL RADIOLOGY | Age: 57
DRG: 215 | End: 2022-01-01
Payer: MEDICARE

## 2022-01-01 ENCOUNTER — HOSPITAL ENCOUNTER (INPATIENT)
Age: 57
LOS: 1 days | DRG: 215 | End: 2022-04-01
Attending: EMERGENCY MEDICINE | Admitting: INTERNAL MEDICINE
Payer: MEDICARE

## 2022-01-01 VITALS
TEMPERATURE: 96.8 F | WEIGHT: 180 LBS | SYSTOLIC BLOOD PRESSURE: 152 MMHG | BODY MASS INDEX: 25.77 KG/M2 | RESPIRATION RATE: 20 BRPM | OXYGEN SATURATION: 99 % | HEIGHT: 70 IN | DIASTOLIC BLOOD PRESSURE: 121 MMHG | HEART RATE: 80 BPM

## 2022-01-01 DIAGNOSIS — I21.3 ST ELEVATION MYOCARDIAL INFARCTION (STEMI), UNSPECIFIED ARTERY (HCC): Primary | ICD-10-CM

## 2022-01-01 LAB
A/G RATIO: 1.7 (ref 1.1–2.2)
ALBUMIN SERPL-MCNC: 4.8 G/DL (ref 3.4–5)
ALP BLD-CCNC: 125 U/L (ref 40–129)
ALT SERPL-CCNC: 183 U/L (ref 10–40)
ANION GAP SERPL CALCULATED.3IONS-SCNC: 17 MMOL/L (ref 3–16)
ANISOCYTOSIS: ABNORMAL
AST SERPL-CCNC: 167 U/L (ref 15–37)
ATYPICAL LYMPHOCYTE RELATIVE PERCENT: 6 % (ref 0–6)
BANDED NEUTROPHILS RELATIVE PERCENT: 7 % (ref 0–7)
BASE EXCESS ARTERIAL: -10 (ref -3–3)
BASOPHILS ABSOLUTE: 0 K/UL (ref 0–0.2)
BASOPHILS RELATIVE PERCENT: 0 %
BILIRUB SERPL-MCNC: 0.3 MG/DL (ref 0–1)
BUN BLDV-MCNC: 11 MG/DL (ref 7–20)
CALCIUM IONIZED: 0.98 MMOL/L (ref 1.12–1.32)
CALCIUM SERPL-MCNC: 9.3 MG/DL (ref 8.3–10.6)
CHLORIDE BLD-SCNC: 100 MMOL/L (ref 99–110)
CO2: 18 MMOL/L (ref 21–32)
CREAT SERPL-MCNC: 1 MG/DL (ref 0.9–1.3)
EKG ATRIAL RATE: 56 BPM
EKG DIAGNOSIS: NORMAL
EKG P AXIS: 45 DEGREES
EKG P-R INTERVAL: 150 MS
EKG Q-T INTERVAL: 426 MS
EKG QRS DURATION: 94 MS
EKG QTC CALCULATION (BAZETT): 411 MS
EKG R AXIS: 46 DEGREES
EKG T AXIS: 33 DEGREES
EKG VENTRICULAR RATE: 56 BPM
EOSINOPHILS ABSOLUTE: 0.2 K/UL (ref 0–0.6)
EOSINOPHILS RELATIVE PERCENT: 1 %
GFR AFRICAN AMERICAN: >60
GFR NON-AFRICAN AMERICAN: >60
GLUCOSE BLD-MCNC: 154 MG/DL (ref 70–99)
GLUCOSE BLD-MCNC: 324 MG/DL (ref 70–99)
HCO3 ARTERIAL: 19.5 MMOL/L (ref 21–29)
HCT VFR BLD CALC: 47.2 % (ref 40.5–52.5)
HEMATOLOGY PATH CONSULT: NORMAL
HEMATOLOGY PATH CONSULT: YES
HEMOGLOBIN: 14.6 GM/DL (ref 13.5–17.5)
HEMOGLOBIN: 15.4 G/DL (ref 13.5–17.5)
HYPOCHROMIA: ABNORMAL
INR BLD: 0.89 (ref 0.88–1.12)
LACTATE: 7.94 MMOL/L (ref 0.4–2)
LYMPHOCYTES ABSOLUTE: 7.5 K/UL (ref 1–5.1)
LYMPHOCYTES RELATIVE PERCENT: 34 %
MACROCYTES: ABNORMAL
MCH RBC QN AUTO: 29.1 PG (ref 26–34)
MCHC RBC AUTO-ENTMCNC: 32.7 G/DL (ref 31–36)
MCV RBC AUTO: 88.9 FL (ref 80–100)
MICROCYTES: ABNORMAL
MONOCYTES ABSOLUTE: 0.9 K/UL (ref 0–1.3)
MONOCYTES RELATIVE PERCENT: 5 %
NEUTROPHILS ABSOLUTE: 10.1 K/UL (ref 1.7–7.7)
NEUTROPHILS RELATIVE PERCENT: 47 %
O2 SAT, ARTERIAL: 95 % (ref 93–100)
PCO2 ARTERIAL: 62.9 MM HG (ref 35–45)
PDW BLD-RTO: 14 % (ref 12.4–15.4)
PERFORMED ON: ABNORMAL
PH ARTERIAL: 7.1 (ref 7.35–7.45)
PLATELET # BLD: 366 K/UL (ref 135–450)
PLATELET SLIDE REVIEW: ADEQUATE
PMV BLD AUTO: 8.4 FL (ref 5–10.5)
PO2 ARTERIAL: 105.3 MM HG (ref 75–108)
POC HEMATOCRIT: 43 % (ref 40.5–52.5)
POC POTASSIUM: 4 MMOL/L (ref 3.5–5.1)
POC SAMPLE TYPE: ABNORMAL
POC SODIUM: 144 MMOL/L (ref 136–145)
POTASSIUM SERPL-SCNC: 4 MMOL/L (ref 3.5–5.1)
PROTHROMBIN TIME: 10 SEC (ref 9.9–12.7)
RBC # BLD: 5.31 M/UL (ref 4.2–5.9)
SLIDE REVIEW: ABNORMAL
SODIUM BLD-SCNC: 135 MMOL/L (ref 136–145)
TCO2 ARTERIAL: 21 MMOL/L
TOTAL PROTEIN: 7.6 G/DL (ref 6.4–8.2)
TROPONIN: <0.01 NG/ML
WBC # BLD: 18.7 K/UL (ref 4–11)

## 2022-01-01 PROCEDURE — 33016 PERICARDIOCENTESIS W/IMAGING: CPT

## 2022-01-01 PROCEDURE — 1200000000 HC SEMI PRIVATE

## 2022-01-01 PROCEDURE — 2580000003 HC RX 258

## 2022-01-01 PROCEDURE — 93458 L HRT ARTERY/VENTRICLE ANGIO: CPT

## 2022-01-01 PROCEDURE — 99284 EMERGENCY DEPT VISIT MOD MDM: CPT

## 2022-01-01 PROCEDURE — C9606 PERC D-E COR REVASC W AMI S: HCPCS

## 2022-01-01 PROCEDURE — 92950 HEART/LUNG RESUSCITATION CPR: CPT

## 2022-01-01 PROCEDURE — 83605 ASSAY OF LACTIC ACID: CPT

## 2022-01-01 PROCEDURE — 85014 HEMATOCRIT: CPT

## 2022-01-01 PROCEDURE — 2580000003 HC RX 258: Performed by: EMERGENCY MEDICINE

## 2022-01-01 PROCEDURE — 6360000002 HC RX W HCPCS: Performed by: EMERGENCY MEDICINE

## 2022-01-01 PROCEDURE — 82803 BLOOD GASES ANY COMBINATION: CPT

## 2022-01-01 PROCEDURE — C1887 CATHETER, GUIDING: HCPCS

## 2022-01-01 PROCEDURE — 94002 VENT MGMT INPAT INIT DAY: CPT

## 2022-01-01 PROCEDURE — B2111ZZ FLUOROSCOPY OF MULTIPLE CORONARY ARTERIES USING LOW OSMOLAR CONTRAST: ICD-10-PCS | Performed by: INTERNAL MEDICINE

## 2022-01-01 PROCEDURE — 93010 ELECTROCARDIOGRAM REPORT: CPT | Performed by: INTERNAL MEDICINE

## 2022-01-01 PROCEDURE — 99152 MOD SED SAME PHYS/QHP 5/>YRS: CPT | Performed by: INTERNAL MEDICINE

## 2022-01-01 PROCEDURE — G0378 HOSPITAL OBSERVATION PER HR: HCPCS

## 2022-01-01 PROCEDURE — 2709999900 HC NON-CHARGEABLE SUPPLY

## 2022-01-01 PROCEDURE — 99291 CRITICAL CARE FIRST HOUR: CPT | Performed by: INTERNAL MEDICINE

## 2022-01-01 PROCEDURE — 6360000002 HC RX W HCPCS

## 2022-01-01 PROCEDURE — 92941 PRQ TRLML REVSC TOT OCCL AMI: CPT | Performed by: INTERNAL MEDICINE

## 2022-01-01 PROCEDURE — C1776 JOINT DEVICE (IMPLANTABLE): HCPCS

## 2022-01-01 PROCEDURE — 85610 PROTHROMBIN TIME: CPT

## 2022-01-01 PROCEDURE — 6370000000 HC RX 637 (ALT 250 FOR IP): Performed by: INTERNAL MEDICINE

## 2022-01-01 PROCEDURE — 33990 INSJ PERQ VAD L HRT ARTERIAL: CPT

## 2022-01-01 PROCEDURE — 027034Z DILATION OF CORONARY ARTERY, ONE ARTERY WITH DRUG-ELUTING INTRALUMINAL DEVICE, PERCUTANEOUS APPROACH: ICD-10-PCS | Performed by: INTERNAL MEDICINE

## 2022-01-01 PROCEDURE — 2700000000 HC OXYGEN THERAPY PER DAY

## 2022-01-01 PROCEDURE — C1769 GUIDE WIRE: HCPCS

## 2022-01-01 PROCEDURE — 33016 PERICARDIOCENTESIS W/IMAGING: CPT | Performed by: INTERNAL MEDICINE

## 2022-01-01 PROCEDURE — 82330 ASSAY OF CALCIUM: CPT

## 2022-01-01 PROCEDURE — 93005 ELECTROCARDIOGRAM TRACING: CPT | Performed by: EMERGENCY MEDICINE

## 2022-01-01 PROCEDURE — 71045 X-RAY EXAM CHEST 1 VIEW: CPT

## 2022-01-01 PROCEDURE — 92920 PRQ TRLUML C ANGIOP 1ART&/BR: CPT

## 2022-01-01 PROCEDURE — 31500 INSERT EMERGENCY AIRWAY: CPT

## 2022-01-01 PROCEDURE — 0W9D3ZZ DRAINAGE OF PERICARDIAL CAVITY, PERCUTANEOUS APPROACH: ICD-10-PCS | Performed by: INTERNAL MEDICINE

## 2022-01-01 PROCEDURE — 2720000010 HC SURG SUPPLY STERILE

## 2022-01-01 PROCEDURE — C1894 INTRO/SHEATH, NON-LASER: HCPCS

## 2022-01-01 PROCEDURE — C1874 STENT, COATED/COV W/DEL SYS: HCPCS

## 2022-01-01 PROCEDURE — 4A023N7 MEASUREMENT OF CARDIAC SAMPLING AND PRESSURE, LEFT HEART, PERCUTANEOUS APPROACH: ICD-10-PCS | Performed by: INTERNAL MEDICINE

## 2022-01-01 PROCEDURE — 02HA3RZ INSERTION OF SHORT-TERM EXTERNAL HEART ASSIST SYSTEM INTO HEART, PERCUTANEOUS APPROACH: ICD-10-PCS | Performed by: INTERNAL MEDICINE

## 2022-01-01 PROCEDURE — 96376 TX/PRO/DX INJ SAME DRUG ADON: CPT

## 2022-01-01 PROCEDURE — B2151ZZ FLUOROSCOPY OF LEFT HEART USING LOW OSMOLAR CONTRAST: ICD-10-PCS | Performed by: INTERNAL MEDICINE

## 2022-01-01 PROCEDURE — 84132 ASSAY OF SERUM POTASSIUM: CPT

## 2022-01-01 PROCEDURE — 6370000000 HC RX 637 (ALT 250 FOR IP): Performed by: EMERGENCY MEDICINE

## 2022-01-01 PROCEDURE — 84484 ASSAY OF TROPONIN QUANT: CPT

## 2022-01-01 PROCEDURE — 82947 ASSAY GLUCOSE BLOOD QUANT: CPT

## 2022-01-01 PROCEDURE — 94761 N-INVAS EAR/PLS OXIMETRY MLT: CPT

## 2022-01-01 PROCEDURE — 93458 L HRT ARTERY/VENTRICLE ANGIO: CPT | Performed by: INTERNAL MEDICINE

## 2022-01-01 PROCEDURE — 5A1935Z RESPIRATORY VENTILATION, LESS THAN 24 CONSECUTIVE HOURS: ICD-10-PCS | Performed by: INTERNAL MEDICINE

## 2022-01-01 PROCEDURE — 85025 COMPLETE CBC W/AUTO DIFF WBC: CPT

## 2022-01-01 PROCEDURE — 96375 TX/PRO/DX INJ NEW DRUG ADDON: CPT

## 2022-01-01 PROCEDURE — 84295 ASSAY OF SERUM SODIUM: CPT

## 2022-01-01 PROCEDURE — 2500000003 HC RX 250 WO HCPCS

## 2022-01-01 PROCEDURE — 5A0221D ASSISTANCE WITH CARDIAC OUTPUT USING IMPELLER PUMP, CONTINUOUS: ICD-10-PCS | Performed by: INTERNAL MEDICINE

## 2022-01-01 PROCEDURE — 0CHY8BZ INSERTION OF AIRWAY INTO MOUTH AND THROAT, VIA NATURAL OR ARTIFICIAL OPENING ENDOSCOPIC: ICD-10-PCS | Performed by: INTERNAL MEDICINE

## 2022-01-01 PROCEDURE — C1725 CATH, TRANSLUMIN NON-LASER: HCPCS

## 2022-01-01 PROCEDURE — 80053 COMPREHEN METABOLIC PANEL: CPT

## 2022-01-01 PROCEDURE — 33990 INSJ PERQ VAD L HRT ARTERIAL: CPT | Performed by: INTERNAL MEDICINE

## 2022-01-01 PROCEDURE — 96374 THER/PROPH/DIAG INJ IV PUSH: CPT

## 2022-01-01 RX ORDER — MIDAZOLAM HYDROCHLORIDE 5 MG/ML
INJECTION INTRAMUSCULAR; INTRAVENOUS
Status: COMPLETED | OUTPATIENT
Start: 2022-01-01 | End: 2022-01-01

## 2022-01-01 RX ORDER — FENTANYL CITRATE 50 UG/ML
50 INJECTION, SOLUTION INTRAMUSCULAR; INTRAVENOUS ONCE
Status: COMPLETED | OUTPATIENT
Start: 2022-01-01 | End: 2022-01-01

## 2022-01-01 RX ORDER — CHLORHEXIDINE GLUCONATE 0.12 MG/ML
15 RINSE ORAL 2 TIMES DAILY
Status: DISCONTINUED | OUTPATIENT
Start: 2022-01-01 | End: 2022-01-01 | Stop reason: HOSPADM

## 2022-01-01 RX ORDER — PROPOFOL 10 MG/ML
10 INJECTION, EMULSION INTRAVENOUS CONTINUOUS
Status: DISCONTINUED | OUTPATIENT
Start: 2022-01-01 | End: 2022-01-01 | Stop reason: HOSPADM

## 2022-01-01 RX ORDER — ALBUTEROL SULFATE 90 UG/1
4 AEROSOL, METERED RESPIRATORY (INHALATION) EVERY 4 HOURS PRN
Status: DISCONTINUED | OUTPATIENT
Start: 2022-01-01 | End: 2022-01-01 | Stop reason: HOSPADM

## 2022-01-01 RX ORDER — FENTANYL CITRATE 50 UG/ML
50 INJECTION, SOLUTION INTRAMUSCULAR; INTRAVENOUS EVERY 30 MIN PRN
Status: DISCONTINUED | OUTPATIENT
Start: 2022-01-01 | End: 2022-01-01 | Stop reason: HOSPADM

## 2022-01-01 RX ORDER — SODIUM CHLORIDE 9 MG/ML
1000 INJECTION, SOLUTION INTRAVENOUS CONTINUOUS
Status: DISCONTINUED | OUTPATIENT
Start: 2022-01-01 | End: 2022-01-01 | Stop reason: HOSPADM

## 2022-01-01 RX ORDER — FENTANYL CITRATE 50 UG/ML
INJECTION, SOLUTION INTRAMUSCULAR; INTRAVENOUS
Status: COMPLETED | OUTPATIENT
Start: 2022-01-01 | End: 2022-01-01

## 2022-01-01 RX ORDER — ONDANSETRON 2 MG/ML
4 INJECTION INTRAMUSCULAR; INTRAVENOUS ONCE
Status: COMPLETED | OUTPATIENT
Start: 2022-01-01 | End: 2022-01-01

## 2022-01-01 RX ORDER — MINERAL OIL AND WHITE PETROLATUM 150; 830 MG/G; MG/G
OINTMENT OPHTHALMIC EVERY 4 HOURS
Status: DISCONTINUED | OUTPATIENT
Start: 2022-01-01 | End: 2022-01-01 | Stop reason: HOSPADM

## 2022-01-01 RX ORDER — FENTANYL CITRATE 50 UG/ML
25 INJECTION, SOLUTION INTRAMUSCULAR; INTRAVENOUS ONCE
Status: COMPLETED | OUTPATIENT
Start: 2022-01-01 | End: 2022-01-01

## 2022-01-01 RX ORDER — POLYVINYL ALCOHOL 14 MG/ML
1 SOLUTION/ DROPS OPHTHALMIC EVERY 4 HOURS
Status: DISCONTINUED | OUTPATIENT
Start: 2022-01-01 | End: 2022-01-01 | Stop reason: HOSPADM

## 2022-01-01 RX ADMIN — FENTANYL CITRATE 100 MCG: 50 INJECTION, SOLUTION INTRAMUSCULAR; INTRAVENOUS at 01:22

## 2022-01-01 RX ADMIN — MIDAZOLAM HYDROCHLORIDE 2 MG: 5 INJECTION INTRAMUSCULAR; INTRAVENOUS at 01:43

## 2022-01-01 RX ADMIN — FENTANYL CITRATE 50 MCG: 50 INJECTION, SOLUTION INTRAMUSCULAR; INTRAVENOUS at 01:29

## 2022-01-01 RX ADMIN — FENTANYL CITRATE 50 MCG: 50 INJECTION, SOLUTION INTRAMUSCULAR; INTRAVENOUS at 01:39

## 2022-01-01 RX ADMIN — FENTANYL CITRATE 50 MCG: 50 INJECTION, SOLUTION INTRAMUSCULAR; INTRAVENOUS at 01:34

## 2022-01-01 RX ADMIN — MIDAZOLAM HYDROCHLORIDE 1 MG: 5 INJECTION INTRAMUSCULAR; INTRAVENOUS at 01:34

## 2022-01-01 RX ADMIN — FENTANYL CITRATE 25 MCG: 0.05 INJECTION, SOLUTION INTRAMUSCULAR; INTRAVENOUS at 00:25

## 2022-01-01 RX ADMIN — FENTANYL CITRATE 50 MCG: 50 INJECTION, SOLUTION INTRAMUSCULAR; INTRAVENOUS at 01:43

## 2022-01-01 RX ADMIN — TICAGRELOR 180 MG: 90 TABLET ORAL at 00:20

## 2022-01-01 RX ADMIN — SODIUM CHLORIDE 1000 ML: 9 INJECTION, SOLUTION INTRAVENOUS at 00:20

## 2022-01-01 RX ADMIN — FENTANYL CITRATE 50 MCG: 0.05 INJECTION, SOLUTION INTRAMUSCULAR; INTRAVENOUS at 00:38

## 2022-01-01 RX ADMIN — FENTANYL CITRATE 50 MCG: 50 INJECTION, SOLUTION INTRAMUSCULAR; INTRAVENOUS at 01:36

## 2022-01-01 RX ADMIN — MIDAZOLAM HYDROCHLORIDE 1 MG: 5 INJECTION INTRAMUSCULAR; INTRAVENOUS at 01:29

## 2022-01-01 RX ADMIN — ONDANSETRON 4 MG: 2 INJECTION INTRAMUSCULAR; INTRAVENOUS at 00:19

## 2022-01-01 RX ADMIN — MIDAZOLAM HYDROCHLORIDE 2 MG: 5 INJECTION INTRAMUSCULAR; INTRAVENOUS at 01:22

## 2022-01-01 ASSESSMENT — PAIN SCALES - GENERAL
PAINLEVEL_OUTOF10: 8

## 2022-01-01 ASSESSMENT — PULMONARY FUNCTION TESTS: PIF_VALUE: 20

## 2022-01-01 ASSESSMENT — PAIN DESCRIPTION - PAIN TYPE: TYPE: ACUTE PAIN

## 2022-01-01 ASSESSMENT — PAIN DESCRIPTION - LOCATION: LOCATION: CHEST

## 2022-01-01 ASSESSMENT — PAIN - FUNCTIONAL ASSESSMENT: PAIN_FUNCTIONAL_ASSESSMENT: 0-10

## 2022-04-01 PROBLEM — I21.3: Status: ACTIVE | Noted: 2022-01-01

## 2022-04-01 NOTE — PROCEDURES
HOSPITAL MEDICINE  Intubation Note    Date of Service:   04/01/22    Procedure: Orotracheal intubation    Indication: Combative agitation while on cath lab table during STEMI    Description: Blade:  [place] 3    View:  grade 1    Findings:  unremarkable    Tube size:  8.5mm    Tube depth:  25cm    Verification: Chest rise  bilateral    Qualitative capnography  (+)      Medications:  etomiate 30  Rocuronium 50

## 2022-04-01 NOTE — ED NOTES
STEMI TIMES    Squad EKG seen and signed by Dr. Gideon Mcwilliams @ [phone]  ER EKG seen and signed by Dr. Gideon Mcwilliams @ [phone]  Cath lab called and activated @ Charlotte Dutton - interventionalist paged @ Zenon Dutton returned call @ [phone] Jose Eduardo Sena  04/01/22 0519

## 2022-04-01 NOTE — DISCHARGE SUMMARY
[address]    DISCHARGE SUMMARY      Patient ID:  Mercy Ngo  [id number] 49 y.o. 1965    Admit date: 2022    Discharge date:  22   Admitting Physician: Brennen Lanza MD     Discharge Physician: Brennen Lanza MD     Admission Diagnoses: ST elevation myocardial infarction (STEMI), unspecified artery [facility]) [I21.3]    Discharge Diagnoses:   Patient Active Problem List   Diagnosis    Cervical radiculopathy    DDD (degenerative disc disease), cervical    Impingement syndrome of shoulder    Lumbar degenerative disc disease    Lumbar facet arthropathy    Lumbar stenosis    Cervical stenosis of spinal canal    Cervical disc displacement    Facet arthropathy, cervical        Discharged Condition:     Hospital Course: Mercy Ngo presented to the ER with chest pain. Found to have anterior stemi. He was taken emergently to the cath lab. Patient brought to the cath lab. He was very restless during the procedure and would not stay still. His bp 160/100. LAD determined to be culprit. However very difficult to wire and LAD lesion appeared to be a chronic occlusion.  200 did eventually cross but balloons were difficult to cross. While inflating the LAD with 3.0 balloon he became combative and was trying to get off the table. We paused procedure and all 4 people held him down while respiratory/anesthesia came to intubate the patient. He was successfully intubated and sedated. LAD access was lost during the process. RCA was visualized next and it appeared to have 99% diffuse proximal and mid lesion. PCI to RCA was performed with a 3.5x38 and 3.5x34 MORTEZA. Unfortunately he had mid RCA perforation. Immediately covered with a 3.5x15 covered stent. He was hypotensive and emergent pericardial tap was performed. 100cc of blood was removed. Pressors were initiated. RCA perforation was successfully sealed. He remained hypotensive and became bradycardic.  His LV gram showed severe anterior apical and inferior akinesis and ef <10%. Temporary pacer was placed. He continued to decline and he became asystolic so CPR was started. After ROSC the impella CP was placed. He never had pulsatility despite adequate CPR for >45 minutes. He had VTx2 requiring defibrillation but ultimately was asystolic. I discussed ECMO with Surgeon on call but we both agreed that the patient had been asystolic for far too long and he would not survive the prolonged wait to go on ECMO circuit. I met with the family and informed them of his passing. I explained his critical condition and that after exhausting all therapeutic efforts we were unable to resuscitate him.            Consults:  IP CONSULT TO CARDIOLOGY  IP CONSULT TO DIETITIAN    Significant Diagnostic Studies:   Echocardiography: not  Stress test:  not done    Disposition: home    Patient Instructions:      Medication List      ASK your doctor about these medications    omeprazole 20 MG delayed release capsule  Commonly known as: PRILOSEC     oxyCODONE-acetaminophen 5-325 MG per tablet  Commonly known as: Percocet  Take 1 tablet by mouth every 8 hours as needed for Pain for up to 3 days.               Beto Kwan MD

## 2022-04-01 NOTE — ED PROVIDER NOTES
Emergency Department Provider Note  Location: [facility]  ED  4/1/2022     Patient Identification  Francoise Felton is a 49 y.o. male    Chief Complaint  Chest Pain (woke up an hour ago with chest pain denies history )      Mode of Arrival  EMS    HPI  (History provided by patient)  This is a 49 y.o. male with a PMH significant for HTN, HLD presented today for chest pain. Patient said he woke up about an hour prior to his arrival in the ED with chest tightness, diaphoresis, and shortness of breath. He rates the pain 8/10 intensity. He chewed 5 baby aspirin and had no relief of the pain. He then activated EMS. EMS obtained an EKG and got concerned of STEMI. They gave the patient 2 nitroglycerin en route. Patient reports minimal relief with nitro. He states he is still having 8/10 pain. He has nausea, no vomiting. Denies abdominal pain. Denies any recent illness    ROS  Review of Systems   Unable to perform ROS: Acuity of condition   pertinent positive and negative as above. Due to acute STEMI, I did not perform full 10 system ROS. I paged cardiologist and activated cath lab. I have reviewed the following nursing documentation:  Allergies: Allergies   Allergen Reactions    Morphine Swelling     PT has swelling after taking Morphine for a long periods of time        Past medical history:  has a past medical history of Back pain, Cervical radiculopathy (7/13/2017), DDD (degenerative disc disease), cervical (7/13/2017), Hyperlipidemia, Hypertension, and Neck pain. Past surgical history:  has a past surgical history that includes Knee arthroscopy. Home medications:   Prior to Admission medications    Medication Sig Start Date End Date Taking?  Authorizing Provider   omeprazole (PRILOSEC) 20 MG delayed release capsule Take 20 mg by mouth    Historical Provider, MD   oxyCODONE-acetaminophen (PERCOCET) 5-325 MG per tablet Take 1 tablet by mouth every 8 hours as needed for Pain for up to 3 days. 5/18/19 6/22/19  [facility]       Social history:  reports that he has been smoking cigarettes. He has a 10.00 pack-year smoking history. He has never used smokeless tobacco. He reports current drug use. Drug: Marijuana Luisa Kales). He reports that he does not drink alcohol. Family history:    Family History   Problem Relation Age of Onset    No Known Problems Mother        Exam  ED Triage Vitals   BP Temp Temp Source Pulse Resp SpO2 Height Weight   04/01/22 0013 04/01/22 0027 04/01/22 0027 04/01/22 0013 04/01/22 0013 04/01/22 0025 04/01/22 0013 04/01/22 0013   (!) 141/95 96.8 °F (36 °C) Oral 66 16 98 % 5' 10\" (1.778 m) 180 lb (81.6 kg)   Physical Exam  Vitals and nursing note reviewed. Constitutional:       General: He is in acute distress. Appearance: He is well-developed. He is ill-appearing. He is not diaphoretic. Comments: Shortly after arrival, patient vomited   HENT:      Head: Normocephalic and atraumatic. Eyes:      General: No scleral icterus. Right eye: No discharge. Left eye: No discharge. Conjunctiva/sclera: Conjunctivae normal.   Neck:      Trachea: No tracheal deviation. Cardiovascular:      Rate and Rhythm: Normal rate and regular rhythm. Heart sounds: Normal heart sounds. No murmur heard. Pulmonary:      Effort: Pulmonary effort is normal. No respiratory distress. Breath sounds: Normal breath sounds. No stridor. No wheezing. Abdominal:      General: There is no distension. Palpations: Abdomen is soft. Tenderness: There is no abdominal tenderness. There is no guarding or rebound. Musculoskeletal:         General: No deformity. Cervical back: Neck supple. Right lower leg: No edema. Left lower leg: No edema. Skin:     General: Skin is warm and dry. Findings: No erythema or rash. Neurological:      Mental Status: He is alert and oriented to person, place, and time.       Cranial Nerves: No dysarthria or facial asymmetry. Psychiatric:         Mood and Affect: Mood is anxious. Behavior: Behavior is cooperative. MDM/ED Course  ED Medication Orders (From admission, onward)    Start Ordered     Status Ordering Provider    04/01/22 0030 04/01/22 0019  ticagrelor (BRILINTA) tablet 180 mg  ONCE         Last MAR action: Given - by ZACKERY SIMMONS on 04/01/22 at [address] [place] [address]    04/01/22 0030 04/01/22 0019  ondansetron (ZOFRAN) injection 4 mg  ONCE         Last MAR action: Given - by ZACKERY SIMMONS on 04/01/22 at [address] [place] [address]    04/01/22 0030 04/01/22 0019  0.9 % sodium chloride infusion  CONTINUOUS         Last MAR action: New Bag - by ZACKERY SIMMONS on 04/01/22 at [address] [place] [address]    04/01/22 0030 04/01/22 0023  fentaNYL (SUBLIMAZE) injection 25 mcg  ONCE         Last MAR action: Given - by ZACKERY SIMMONS on 04/01/22 at [facility]          EKG  The Ekg interpreted by me in the absence of a cardiologist shows. sinus bradycardia, rate=56    Axis is   Normal  QTc is  normal  Significant ST segment elevation in V2 through V6 with reciprocal changes in the inferior lead. EKG is consistent with acute STEMI. These changes are new compared to prior EKG dated 12/6/19    Radiology  Pending at the time the patient went to cath lab. I reviewed the images myself and did not see a widen mediastinum.        Labs  Results for orders placed or performed during the hospital encounter of 04/01/22   CBC with Auto Differential   Result Value Ref Range    WBC 18.7 (H) 4.0 - 11.0 K/uL    RBC 5.31 4.20 - 5.90 M/uL    Hemoglobin 15.4 13.5 - 17.5 g/dL    Hematocrit 47.2 40.5 - 52.5 %    MCV 88.9 80.0 - 100.0 fL    MCH 29.1 26.0 - 34.0 pg    MCHC 32.7 31.0 - 36.0 g/dL    RDW 14.0 12.4 - 15.4 %    Platelets 451 260 - 127 K/uL    MPV 8.4 5.0 - 10.5 fL    PLATELET SLIDE REVIEW Adequate     SLIDE REVIEW see below     Path Consult Yes     Neutrophils % 47.0 %    Lymphocytes % 34.0 %    Monocytes % 5.0 % Eosinophils % 1.0 %    Basophils % 0.0 %    Neutrophils Absolute 10.1 (H) 1.7 - 7.7 K/uL    Lymphocytes Absolute 7.5 (H) 1.0 - 5.1 K/uL    Monocytes Absolute 0.9 0.0 - 1.3 K/uL    Eosinophils Absolute 0.2 0.0 - 0.6 K/uL    Basophils Absolute 0.0 0.0 - 0.2 K/uL    Bands Relative 7 0 - 7 %    Atypical Lymphocytes Relative 6 0 - 6 %    Anisocytosis Occasional (A)     Macrocytes Occasional (A)     Microcytes Occasional (A)     Hypochromia Occasional (A)    Comprehensive Metabolic Panel   Result Value Ref Range    Sodium 135 (L) 136 - 145 mmol/L    Potassium 4.0 3.5 - 5.1 mmol/L    Chloride 100 99 - 110 mmol/L    CO2 18 (L) 21 - 32 mmol/L    Anion Gap 17 (H) 3 - 16    Glucose 154 (H) 70 - 99 mg/dL    BUN 11 7 - 20 mg/dL    CREATININE 1.0 0.9 - 1.3 mg/dL    GFR Non-African American >60 >60    GFR African American >60 >60    Calcium 9.3 8.3 - 10.6 mg/dL    Total Protein 7.6 6.4 - 8.2 g/dL    Albumin 4.8 3.4 - 5.0 g/dL    Albumin/Globulin Ratio 1.7 1.1 - 2.2    Total Bilirubin 0.3 0.0 - 1.0 mg/dL    Alkaline Phosphatase 125 40 - 129 U/L     (H) 10 - 40 U/L     (H) 15 - 37 U/L   Troponin   Result Value Ref Range    Troponin <0.01 <0.01 ng/mL   Protime-INR   Result Value Ref Range    Protime 10.0 9.9 - 12.7 sec    INR 0.89 0.88 - 1.12         - Patient seen and evaluated in room 1.  49 y.o. male presented for chest pain diaphoresis. EKG consistent with STEMI. - EMS faxed the EKG which I identified as STEMI. When EMS called, ETA was 30 minutes so we held off activating cath lab. 15 minutes later, EMS arrived so we paged cardiologist and cath lab immediately. - Patient has no relief from aspirin and nitroglycerin prior to arrival.  He was still complaining 8/10 pain. He started vomiting. I therefore ordered Zofran. My initial plan was to order morphine for his pain but he is allergic to morphine so I change the order to fentanyl (25 mcg). I also ordered oxygen and Brilinta.   - I spoke with Dr. Chacha Serrato and sent him the EKG image. He agrees that cath lab should be activated and is on his way in.   - While waiting on cath lab, patient still has significant pain. Repeat /93 so I ordered another dose of Fentanyl IV (50 mcg)    I spoke with Dr. Katie Singh, cardiologist.  We thoroughly discussed the history, physical exam, laboratory and imaging studies, as well as, emergency department course. Based upon that discussion, we've decided to admit Alexia Magana for further observation and evaluation of Tomer Mares's chest pain. As I have deemed necessary from their history, physical, and studies, I have considered and evaluated Alexia Magana for the following diagnoses:  ACUTE CORONARY SYNDROME, PERICARDIAL TAMPONADE, PNEUMOTHORAX, PULMONARY EMBOLISM, and THORACIC DISSECTION. Clinical Impression:  1. ST elevation myocardial infarction (STEMI), unspecified artery ([address]          Disposition:  Transfer to cath lab in critical condition. Blood pressure 124/87, pulse 57, temperature 96.8 °F (36 °C), temperature source Oral, resp. rate 13, height 5' 10\" (1.778 m), weight 180 lb (81.6 kg), SpO2 99 %. Total critical care time is 25 minutes, which excludes separately billable procedures and updating family. Time spent is specifically for management of the presenting complaint and symptoms initially, direct bedside care, reevaluation, review of records, and consultation. There was a high probability of clinically significant life-threatening deterioration in the patient's condition, which required my urgent intervention. This chart was generated in part by using Dragon Dictation system and may contain errors related to that system including errors in grammar, punctuation, and spelling, as well as words and phrases that may be inappropriate. If there are any questions or concerns please feel free to contact the dictating provider for clarification.      Agatha Matos MD  [facility] Centerkimberli Cabezas, MD  04/01/22 [id number]

## 2022-04-01 NOTE — PROGRESS NOTES
04/01/22 0200   Vent Information   $Ventilation $Initial Day   Vent Type 980   Vent Mode AC/VC   Vt Ordered 450 mL   Rate Set 20 bmp   Peak Flow 50 L/min   FiO2  100 %   Sensitivity 3   PEEP/CPAP 5   Humidification Source HME   Vent Patient Data   Peak Inspiratory Pressure 20 cmH2O   Mean Airway Pressure 11 cmH20   Rate Measured 20 br/min   Vt Exhaled 450 mL   Minute Volume 9.86 Liters   I:E Ratio 1:2.1   Cough/Sputum   Sputum How Obtained None   Additional Respiratory  Assessments   Resp 20   Position Semi-Velasquez's   Cuff Pressure (cm H2O) 30 cm H2O   Alarm Settings   High Pressure Alarm 40 cmH2O   Low Minute Volume Alarm 2 L/min   High Respiratory Rate 40 br/min   Low Exhaled Vt  200 mL   ETT (adult)   Placement Date/Time: 04/01/22 0201   Preoxygenation: Yes  Type: Cuffed  Tube Size: 8 mm  Laryngoscope: GlideScope  Location: Oral  Insertion attempts: 1  Placement Verified By[de-identified] Auscultation;Capnometry  Secured at: 25 cm  Measured From: Lips   $ Intubation emergent  $ Yes   Secured at 25 cm   Measured From [address] [employer] tube taylor   Site Condition Dry

## 2022-04-01 NOTE — ED NOTES
Pt woke up with chest about around 2300.  Pt was given 324 mg asa and 2 nitro 0.4 SL by ems prior to arrival      Ashley Owens RN  04/01/22 0040

## 2022-04-01 NOTE — CONSULTS
[address] [facility]  [phone]      Chief Complaint   Patient presents with    Chest Pain     woke up an hour ago with chest pain denies history             History of Present Illness:  Joleen Knapp is a 49 y.o. patient who presented to the hospital with complaints of sudden onset of chest pain that woke him up from sleep. He came to the ED immediately. He was found to have anterior STEMI on ECG. He was given aspirin, brilinta and heparin. I have been asked to provide consultation regarding further management and testing. Past Medical History:   has a past medical history of Back pain, Cervical radiculopathy, DDD (degenerative disc disease), cervical, Hyperlipidemia, Hypertension, and Neck pain. Surgical History:   has a past surgical history that includes Knee arthroscopy. Social History:   reports that he has been smoking cigarettes. He has a 10.00 pack-year smoking history. He has never used smokeless tobacco. He reports current drug use. Drug: Marijuana Rebecca Awkward). He reports that he does not drink alcohol. Family History:  family history includes No Known Problems in his mother. Home Medications:  Were reviewed and are listed in nursing record. and/or listed below  Prior to Admission medications    Medication Sig Start Date End Date Taking? Authorizing Provider   omeprazole (PRILOSEC) 20 MG delayed release capsule Take 20 mg by mouth    Historical Provider, MD   oxyCODONE-acetaminophen (PERCOCET) 5-325 MG per tablet Take 1 tablet by mouth every 8 hours as needed for Pain for up to 3 days.  5/18/19 6/22/19  BERNARD Saucedo        Current Medications:  Current Facility-Administered Medications   Medication Dose Route Frequency Provider Last Rate Last Admin    0.9 % sodium chloride infusion  1,000 mL IntraVENous Continuous Maurisio Reis  mL/hr at 04/01/22 0020 1,000 mL at 04/01/22 0020    propofol injection  10 mcg/kg/min IntraVENous Continuous Raz Spann MD  polyvinyl alcohol (LIQUIFILM TEARS) 1.4 % ophthalmic solution 1 drop  1 drop Both Eyes Q4H Melvina Dwyer MD        And    lubrifresh P.M. (artificial tears) ophthalmic ointment   Both Eyes Q4H Melvina Dwyer MD        chlorhexidine (PERIDEX) 0.12 % solution 15 mL  15 mL Mouth/Throat BID Melvina Dwyer MD        famotidine (PEPCID) injection 20 mg  20 mg IntraVENous BID Melvina Dwyer MD        albuterol sulfate  (90 Base) MCG/ACT inhaler 4 puff  4 puff Inhalation Q4H PRN Melvina Dwyer MD        And    ipratropium (ATROVENT HFA) 17 MCG/ACT inhaler 4 puff  4 puff Inhalation Q4H PRN Melvina Dwyer MD        fentaNYL (SUBLIMAZE) 1,000 mcg in sodium chloride 0.9 % 100 mL infusion   mcg/hr IntraVENous Continuous Melvina Dwyer MD        fentaNYL (SUBLIMAZE) injection 50 mcg  50 mcg IntraVENous Q30 Min PRN Melvina Dwyer MD            Allergies:  Morphine     Review of Systems:     · Constitutional: there has been no unanticipated weight loss. There's been no change in energy level, sleep pattern, or activity level. · Eyes: No visual changes or diplopia. No scleral icterus. · ENT: No Headaches, hearing loss or vertigo. No mouth sores or sore throat. · Cardiovascular: Reviewed in HPI  · Respiratory: No cough or wheezing, no sputum production. No hematemesis. · Gastrointestinal: No abdominal pain, appetite loss, blood in stools. No change in bowel or bladder habits. · Genitourinary: No dysuria, trouble voiding, or hematuria. · Musculoskeletal:  No gait disturbance, weakness or joint complaints. · Integumentary: No rash or pruritis. · Neurological: No headache, diplopia, change in muscle strength, numbness or tingling. No change in gait, balance, coordination, mood, affect, memory, mentation, behavior. · Psychiatric: No anxiety, no depression. · Endocrine: No malaise, fatigue or temperature intolerance. No excessive thirst, fluid intake, or urination.  No tremor. · Hematologic/Lymphatic: No abnormal bruising or bleeding, blood clots or swollen lymph nodes. · Allergic/Immunologic: No nasal congestion or hives.   ·     Physical Examination:    Vitals:    04/01/22 0200   BP:    Pulse:    Resp: 20   Temp:    SpO2:     Weight: 180 lb (81.6 kg)         General Appearance:  Alert, cooperative, in severe distress, appears stated age   Head:  Normocephalic, without obvious abnormality, atraumatic   Eyes:  PERRL, conjunctiva/corneas clear       Nose: Nares normal, no drainage or sinus tenderness   Throat: Lips, mucosa, and tongue normal   Neck: Supple, symmetrical, trachea midline, no adenopathy, thyroid: not enlarged, symmetric, no tenderness/mass/nodules, no carotid bruit or JVD       Lungs:   Clear to auscultation bilaterally, respirations unlabored   Chest Wall:  No tenderness or deformity   Heart:  Regular rate and rhythm, S1, S2 normal, no murmur, rub or gallop   Abdomen:   Soft, non-tender, bowel sounds active all four quadrants,  no masses, no organomegaly           Extremities: Extremities normal, atraumatic, appeared cyanotic or edema   Pulses: 2+ and symmetric   Skin: Skin color, texture, turgor normal, no rashes or lesions   Pysch: Normal mood and affect   Neurologic: Normal gross motor and sensory exam.         Labs  CBC:   Lab Results   Component Value Date    WBC 18.7 04/01/2022    RBC 5.31 04/01/2022    HGB 14.6 04/01/2022    HCT 47.2 04/01/2022    MCV 88.9 04/01/2022    RDW 14.0 04/01/2022     04/01/2022     CMP:    Lab Results   Component Value Date     04/01/2022    K 4.0 04/01/2022     04/01/2022    CO2 18 04/01/2022    BUN 11 04/01/2022    CREATININE 1.0 04/01/2022    GFRAA >60 04/01/2022    AGRATIO 1.7 04/01/2022    LABGLOM >60 04/01/2022    GLUCOSE 154 04/01/2022    PROT 7.6 04/01/2022    CALCIUM 9.3 04/01/2022    BILITOT 0.3 04/01/2022    ALKPHOS 125 04/01/2022     04/01/2022     04/01/2022     PT/INR:  No results found for: PTINR  Lab Results   Component Value Date    TROPONINI <0.01 04/01/2022       EKG:  I have reviewed EKG with the following interpretation:  Impression:     Sinus bradycardiaAnteroseptal infarct , possibly acuteLateral injury pattern ** ** ** * ACUTE MI  ** ** ** **Abnormal ECGWhen compared with ECG of 06-DEC-2019 12:53,Anteroseptal infarct is now PresentNon-specific change in ST segment in Inferior leadsST elevation now present in Anterolateral leads            Pt has CAD with following history:  CABG: (date/details),   Valve replacement (date/details)   GXT/Myoview/Lexiscan: (date)  (results)   Stress/echo: (date) (result)   CATH/PCI:  (date)- (results)  - (# and type of stents) -   ECHO: (date) results EF    %. JOSIAS (date) (results)  EP procedures/studies/ablation:  (specific data). Device information:  Event monitor: (date/results)    All testing and labs listed below were personally reviewed. Assessment  Patient Active Problem List   Diagnosis    Cervical radiculopathy    DDD (degenerative disc disease), cervical    Impingement syndrome of shoulder    Lumbar degenerative disc disease    Lumbar facet arthropathy    Lumbar stenosis    Cervical stenosis of spinal canal    Cervical disc displacement    Facet arthropathy, cervical         Plan:    I had the opportunity to review the clinical symptoms and presentation of Rocael Goodman. Assessment/Plan:  Active Problems:  Anterior STEMI: for emergent LHC. Based on these findings I recommend left heart cath for definitive evaluation of coronary arteries. Risks, benefits, expectations, and alternative treatments were discussed. Questions appropriately answered. Rocael Goodman agrees to proceed and verbalized understanding. Critical Care   Due to the high probability of clinically significant life threating deterioration of the patient's condition that required my urgent intervention, a total critical care time of >35 minutes was used.  This time excludes any time that may have been spent performing procedures. This includes but not limited to vital sign monitoring, telemetry monitoring, continuous pulse oximety, IV medication, clinical response to the IV medications, documentation time , consultation time, interpretation of lab data, review of nursing notes and old record review.      All questions and concerns were addressed to the patient/family. Alternatives to my treatment were discussed. The note was completed using EMR. Every effort was made to ensure accuracy; however, inadvertent computerized transcription errors may be present.        I will address the patient's cardiac risk factors and adjusted pharmacologic treatment as needed. In addition, I have reinforced the need for patient directed risk factor modification. Tobacco use was discussed with the patient and educated on the negative effects. I have asked the patient to not utilize these agents. Thank you for allowing to us to participate in the care or [place]. Further evaluation will be based upon the patient's clinical course and testing results. All questions and concerns were addressed to the patient/family. Alternatives to my treatment were discussed. The note was completed using EMR. Every effort was made to ensure accuracy; however, inadvertent computerized transcription errors may be present.     Hai Barnett MD, MD 4/1/2022 3:53 AM
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
## 2022-04-01 NOTE — FLOWSHEET NOTE
Supported family at pt's end of life. Due to the shock of pt's death, the family is unprepared to make a decision about  home arrangements at present. Nurse has been informed of this and she provided the unit phone # so pt's wife can call with that information as soon as possible. [phone]   Encounter Summary   Services provided to: Family   Referral/Consult From: Nurse   Support System Family members   Complexity of Encounter High   Length of Encounter 3 hours;30 minutes   Grief and Life Adjustment   Type Death   Assessment Grieving; Anxious; Shock; Coping   Intervention Active listening;Explored feelings, thoughts, concerns;Prayer; End of life care   Outcome Expressed gratitude

## 2022-04-01 NOTE — ED NOTES
Call placed to [address] [place] [address] Cardiology @ [phone]  Re: stemi per Dr. Gideon Yeager @ [facility]  04/01/22 7503

## 2022-04-01 NOTE — OP NOTE
Patient:  Melody Sanches   :   1965    Procedural Summary  ~Consent:   Obtained written and verbal consent      Risks/benefits explained in detail  ~Procedure:    Left Heart Catheterization  ~Medications:    Procedural sedation with minimal conscious sedation  ~Complications:   None  ~Blood Loss:    <10cc  ~Specimens:    None obtained  ~Pre-sedation re-evaluation: Performed immediately prior to procedure. Medication and Procedural Reconciliation:  An independent trained observer pushed medications at my direction. We monitored the patient's level of consciousness and vital signs/physiologic status throughout the procedure duration (see start and stop times below). Sedation: 6 mg Versed, 350 mcg Fentanyl  Sedation start: 117  Sedation stop: 314    Cardiac Cath PCI, Impella:  Anatomy:   LM-nml   LAD-mid 100%  Cx-nml  OM- nml  RCA-prox and mid 99% CARMELO 2 flow  RPDA- nml  LVEF- 15%  LVG- anterior, apical akinesis  LVEDP- 30    Intervention  ~Patient brought to the cath lab. He was very restless during the procedure and would not stay still. His bp 160/100. LAD determined to be culprit. However very difficult to wire and LAD lesion appeared to be a chronic occlusion.  200 did eventually cross but balloons were difficult to cross. While inflating the LAD with 3.0 balloon he became combative and was trying to get off the table. We paused procedure and all 4 people held him down while respiratory/anesthesia came to intubate the patient. He was successfully intubated and sedated. LAD access was lost during the process. RCA was visualized next and it appeared to have 99% diffuse proximal and mid lesion. PCI to RCA was performed with a 3.5x38 and 3.5x34 MORTEZA. Unfortunately he had mid RCA perforation. Immediately covered with a 3.5x15 covered stent. He was hypotensive and emergent pericardial tap was performed. 100cc of blood was removed. Pressors were initiated. RCA perforation was successfully sealed.  He remained hypotensive and became bradycardic. His LV gram showed severe anterior apical and inferior akinesis and ef <10%. Temporary pacer was placed. He continued to decline and he became asystolic so CPR was started. After ROSC the impella CP was placed. He never had pulsatility despite adequate CPR for >45 minutes. He had VTx2 requiring defibrillation but ultimately was asystolic. I discussed ECMO with Surgeon on call but we both agreed that the patient had been asystolic for far too long and he would not survive the prolonged wait to go on ECMO circuit. Contrast: 220  [place] Time: 26.8  Access: R radial. R CFA. R CFV    Impression  ~Coronary Angiography w/ Anterior STEMI, probably inferior STEMI  ~LVG with LVEF of <10% and LAD, RCA regional wall motion abnormalities  ~PCI to LAD with angioplasty only due to interruption. ~PCI to RCA with perforation that was sealed with covered stent  ~pericardial tamponade s/p pericardiocentesis  ~Cardiac shock and arrest with impella placement  ~CPR with no ROSC after 45 minutes    I met with the family and informed them of his passing. I explained his critical condition and that after exhausting all therapeutic efforts we were unable to resuscitate him.              iGna Rosa MD, MD 4/1/2022 3:24 AM

## 2022-04-01 NOTE — H&P
[address]   H&P  [phone]      Chief Complaint   Patient presents with    Chest Pain     woke up an hour ago with chest pain denies history             History of Present Illness:  Rocael Goodman is a 49 y.o. patient who presented to the hospital with complaints of sudden onset of chest pain that woke him up from sleep. He came to the ED immediately. He was found to have anterior STEMI on ECG. He was given aspirin, brilinta and heparin. I have been asked to provide consultation regarding further management and testing. Past Medical History:   has a past medical history of Back pain, Cervical radiculopathy, DDD (degenerative disc disease), cervical, Hyperlipidemia, Hypertension, and Neck pain. Surgical History:   has a past surgical history that includes Knee arthroscopy. Social History:   reports that he has been smoking cigarettes. He has a 10.00 pack-year smoking history. He has never used smokeless tobacco. He reports current drug use. Drug: Marijuana [place]). He reports that he does not drink alcohol. Family History:  family history includes No Known Problems in his mother. Home Medications:  Were reviewed and are listed in nursing record. and/or listed below  Prior to Admission medications    Medication Sig Start Date End Date Taking? Authorizing Provider   omeprazole (PRILOSEC) 20 MG delayed release capsule Take 20 mg by mouth    Historical Provider, MD   oxyCODONE-acetaminophen (PERCOCET) 5-325 MG per tablet Take 1 tablet by mouth every 8 hours as needed for Pain for up to 3 days.  5/18/19 6/22/19  BERNARD Cisse        Current Medications:  Current Facility-Administered Medications   Medication Dose Route Frequency Provider Last Rate Last Admin    0.9 % sodium chloride infusion  1,000 mL IntraVENous Continuous Yamila Kumari  mL/hr at 04/01/22 0020 1,000 mL at 04/01/22 0020    propofol injection  10 mcg/kg/min IntraVENous Continuous MD Elena Gomez polyvinyl alcohol (LIQUIFILM TEARS) 1.4 % ophthalmic solution 1 drop  1 drop Both Eyes Q4H Jeremiah Oliver MD        And    lubrifresh P.M. (artificial tears) ophthalmic ointment   Both Eyes Q4H Jeremiah Oliver MD        chlorhexidine (PERIDEX) 0.12 % solution 15 mL  15 mL Mouth/Throat BID Jeremiah Oliver MD        famotidine (PEPCID) injection 20 mg  20 mg IntraVENous BID Jeremiah Oliver MD        albuterol sulfate  (90 Base) MCG/ACT inhaler 4 puff  4 puff Inhalation Q4H PRN Jeremiah Oliver MD        And    ipratropium (ATROVENT HFA) 17 MCG/ACT inhaler 4 puff  4 puff Inhalation Q4H PRN Jeremiah Oliver MD        fentaNYL (SUBLIMAZE) 1,000 mcg in sodium chloride 0.9 % 100 mL infusion   mcg/hr IntraVENous Continuous Jeremiah Oliver MD        fentaNYL (SUBLIMAZE) injection 50 mcg  50 mcg IntraVENous Q30 Min PRN Jeremiah Oliver MD            Allergies:  Morphine     Review of Systems:     · Constitutional: there has been no unanticipated weight loss. There's been no change in energy level, sleep pattern, or activity level. · Eyes: No visual changes or diplopia. No scleral icterus. · ENT: No Headaches, hearing loss or vertigo. No mouth sores or sore throat. · Cardiovascular: Reviewed in HPI  · Respiratory: No cough or wheezing, no sputum production. No hematemesis. · Gastrointestinal: No abdominal pain, appetite loss, blood in stools. No change in bowel or bladder habits. · Genitourinary: No dysuria, trouble voiding, or hematuria. · Musculoskeletal:  No gait disturbance, weakness or joint complaints. · Integumentary: No rash or pruritis. · Neurological: No headache, diplopia, change in muscle strength, numbness or tingling. No change in gait, balance, coordination, mood, affect, memory, mentation, behavior. · Psychiatric: No anxiety, no depression. · Endocrine: No malaise, fatigue or temperature intolerance. No excessive thirst, fluid intake, or urination.  No tremor. · Hematologic/Lymphatic: No abnormal bruising or bleeding, blood clots or swollen lymph nodes. · Allergic/Immunologic: No nasal congestion or hives.   ·     Physical Examination:    Vitals:    04/01/22 0200   BP:    Pulse:    Resp: 20   Temp:    SpO2:     Weight: 180 lb (81.6 kg)         General Appearance:  Alert, cooperative, in severe distress, appears stated age   Head:  Normocephalic, without obvious abnormality, atraumatic   Eyes:  PERRL, conjunctiva/corneas clear       Nose: Nares normal, no drainage or sinus tenderness   Throat: Lips, mucosa, and tongue normal   Neck: Supple, symmetrical, trachea midline, no adenopathy, thyroid: not enlarged, symmetric, no tenderness/mass/nodules, no carotid bruit or JVD       Lungs:   Clear to auscultation bilaterally, respirations unlabored   Chest Wall:  No tenderness or deformity   Heart:  Regular rate and rhythm, S1, S2 normal, no murmur, rub or gallop   Abdomen:   Soft, non-tender, bowel sounds active all four quadrants,  no masses, no organomegaly           Extremities: Extremities normal, atraumatic, appeared cyanotic or edema   Pulses: 2+ and symmetric   Skin: Skin color, texture, turgor normal, no rashes or lesions   Pysch: Normal mood and affect   Neurologic: Normal gross motor and sensory exam.         Labs  CBC:   Lab Results   Component Value Date    WBC 18.7 04/01/2022    RBC 5.31 04/01/2022    HGB 14.6 04/01/2022    HCT 47.2 04/01/2022    MCV 88.9 04/01/2022    RDW 14.0 04/01/2022     04/01/2022     CMP:    Lab Results   Component Value Date     04/01/2022    K 4.0 04/01/2022     04/01/2022    CO2 18 04/01/2022    BUN 11 04/01/2022    CREATININE 1.0 04/01/2022    GFRAA >60 04/01/2022    AGRATIO 1.7 04/01/2022    LABGLOM >60 04/01/2022    GLUCOSE 154 04/01/2022    PROT 7.6 04/01/2022    CALCIUM 9.3 04/01/2022    BILITOT 0.3 04/01/2022    ALKPHOS 125 04/01/2022     04/01/2022     04/01/2022     PT/INR:  No results found for: PTINR  Lab Results   Component Value Date    TROPONINI <0.01 04/01/2022       EKG:  I have reviewed EKG with the following interpretation:  Impression:     Sinus bradycardiaAnteroseptal infarct , possibly acuteLateral injury pattern ** ** ** * ACUTE MI  ** ** ** **Abnormal ECGWhen compared with ECG of 06-DEC-2019 12:53,Anteroseptal infarct is now PresentNon-specific change in ST segment in Inferior leadsST elevation now present in Anterolateral leads            Pt has CAD with following history:  CABG: (date/details),   Valve replacement (date/details)   GXT/Myoview/Lexiscan: (date)  (results)   Stress/echo: (date) (result)   CATH/PCI:  (date)- (results)  - (# and type of stents) -   ECHO: (date) results EF    %. JOSIAS (date) (results)  EP procedures/studies/ablation:  (specific data). Device information:  Event monitor: (date/results)    All testing and labs listed below were personally reviewed. Assessment  Patient Active Problem List   Diagnosis    Cervical radiculopathy    DDD (degenerative disc disease), cervical    Impingement syndrome of shoulder    Lumbar degenerative disc disease    Lumbar facet arthropathy    Lumbar stenosis    Cervical stenosis of spinal canal    Cervical disc displacement    Facet arthropathy, cervical    Acute ST elevation myocardial infarction (STEMI) due to occlusion of left anterior descending (LAD) coronary artery ([facility])    Acute ST elevation myocardial infarction (STEMI) due to occlusion of left coronary artery ([facility] [address]         Plan:    I had the opportunity to review the clinical symptoms and presentation of Nadir Casillas. Assessment/Plan:  Active Problems:  Anterior STEMI: for emergent LHC. Based on these findings I recommend left heart cath for definitive evaluation of coronary arteries. Risks, benefits, expectations, and alternative treatments were discussed. Questions appropriately answered. Nadir Casillas agrees to proceed and verbalized understanding. Critical Care   Due to the high probability of clinically significant life threating deterioration of the patient's condition that required my urgent intervention, a total critical care time of >35 minutes was used. This time excludes any time that may have been spent performing procedures. This includes but not limited to vital sign monitoring, telemetry monitoring, continuous pulse oximety, IV medication, clinical response to the IV medications, documentation time , consultation time, interpretation of lab data, review of nursing notes and old record review.      All questions and concerns were addressed to the patient/family. Alternatives to my treatment were discussed. The note was completed using EMR. Every effort was made to ensure accuracy; however, inadvertent computerized transcription errors may be present.        I will address the patient's cardiac risk factors and adjusted pharmacologic treatment as needed. In addition, I have reinforced the need for patient directed risk factor modification. Tobacco use was discussed with the patient and educated on the negative effects. I have asked the patient to not utilize these agents. Thank you for allowing to us to participate in the care or Janine Millard. Further evaluation will be based upon the patient's clinical course and testing results. All questions and concerns were addressed to the patient/family. Alternatives to my treatment were discussed. The note was completed using EMR. Every effort was made to ensure accuracy; however, inadvertent computerized transcription errors may be present.     Valerie Collins MD, MD 4/1/2022 8:04 AM